# Patient Record
Sex: FEMALE | Race: BLACK OR AFRICAN AMERICAN | Employment: PART TIME | URBAN - METROPOLITAN AREA
[De-identification: names, ages, dates, MRNs, and addresses within clinical notes are randomized per-mention and may not be internally consistent; named-entity substitution may affect disease eponyms.]

---

## 2017-06-09 ENCOUNTER — HOSPITAL ENCOUNTER (OUTPATIENT)
Dept: ULTRASOUND IMAGING | Age: 57
Discharge: HOME | End: 2017-06-09
Attending: NURSE PRACTITIONER
Payer: COMMERCIAL

## 2017-06-09 DIAGNOSIS — E04.9 ENLARGED THYROID: ICD-10-CM

## 2017-06-09 PROCEDURE — 76536 US EXAM OF HEAD AND NECK: CPT

## 2017-07-03 ENCOUNTER — HOSPITAL ENCOUNTER (OUTPATIENT)
Dept: ULTRASOUND IMAGING | Age: 57
Discharge: HOME | End: 2017-07-03
Attending: NURSE PRACTITIONER
Payer: COMMERCIAL

## 2017-07-03 DIAGNOSIS — R31.9 HEMATURIA SYNDROME: ICD-10-CM

## 2017-07-03 PROCEDURE — 76770 US EXAM ABDO BACK WALL COMP: CPT

## 2017-08-07 ENCOUNTER — HOSPITAL ENCOUNTER (OUTPATIENT)
Dept: MAMMOGRAPHY | Age: 57
Discharge: HOME | End: 2017-08-07
Attending: SURGERY
Payer: COMMERCIAL

## 2017-08-07 DIAGNOSIS — Z12.31 ENCOUNTER FOR SCREENING MAMMOGRAM FOR BREAST CANCER: ICD-10-CM

## 2017-08-07 PROCEDURE — 77063 BREAST TOMOSYNTHESIS BI: CPT

## 2018-08-08 ENCOUNTER — HOSPITAL ENCOUNTER (OUTPATIENT)
Dept: MAMMOGRAPHY | Age: 58
Discharge: HOME | End: 2018-08-08
Attending: NURSE PRACTITIONER
Payer: COMMERCIAL

## 2018-08-08 DIAGNOSIS — Z12.31 VISIT FOR SCREENING MAMMOGRAM: ICD-10-CM

## 2018-08-08 PROCEDURE — 77063 BREAST TOMOSYNTHESIS BI: CPT

## 2019-08-05 ENCOUNTER — HOSPITAL ENCOUNTER (OUTPATIENT)
Dept: PHYSICAL THERAPY | Age: 59
End: 2019-08-05
Payer: MEDICAID

## 2019-08-12 ENCOUNTER — HOSPITAL ENCOUNTER (OUTPATIENT)
Dept: MAMMOGRAPHY | Age: 59
Discharge: HOME | End: 2019-08-12
Attending: NURSE PRACTITIONER
Payer: MEDICAID

## 2019-08-12 DIAGNOSIS — Z12.31 VISIT FOR SCREENING MAMMOGRAM: ICD-10-CM

## 2019-08-12 PROCEDURE — 77063 BREAST TOMOSYNTHESIS BI: CPT

## 2019-08-14 ENCOUNTER — HOSPITAL ENCOUNTER (OUTPATIENT)
Dept: PHYSICAL THERAPY | Age: 59
Discharge: HOME | End: 2019-08-14
Payer: MEDICAID

## 2019-08-14 PROCEDURE — 97110 THERAPEUTIC EXERCISES: CPT

## 2019-08-14 PROCEDURE — 97161 PT EVAL LOW COMPLEX 20 MIN: CPT

## 2019-08-14 PROCEDURE — 97530 THERAPEUTIC ACTIVITIES: CPT

## 2019-08-14 NOTE — PROGRESS NOTES
PT DAILY TREATMENT NOTE - [facility]     Patient Name: Edelmira Lee  Date:2019  : 1960  [x]  Patient  Verified  Payor: BLUE CROSS MEDICAID / Plan: [facility] [employer] HE[employer]ERS PLUS / Product Type: Managed Care Medicaid /    In time:908  Out time:955  Total Treatment Time (min): 47  Total Timed Codes (min): 25  1:1 Treatment Time ( only): 52   Visit #: 1 of 4    Treatment Area: Pain in left foot [M79.672]  Plantar fasciitis of left foot [M72.2]    SUBJECTIVE  Pain Level (0-10 scale): 0  Any medication changes, allergies to medications, adverse drug reactions, diagnosis change, or new procedure performed?: [x] No    [] Yes (see summary sheet for update)  Subjective functional status:   [x] See Eval form in paper chart      OBJECTIVE    22 min []Eval                  []Re-Eval       10 min Therapeutic Exercise:  [x] See flow sheet :   Rationale: increase ROM, increase strength and improve coordination to improve the patients ability to perform ADLs. 15 min Therapeutic Activity:  [x]  See flow sheet :patient education on foot type, wear and LE mechanics/ squatting. Work modifications   Rationale: increase ROM, increase strength, improve coordination, improve balance and increase proprioception  to improve the patients ability to perform work tasks. With   [] TE   [] TA   [] neuro   [] other: Patient Education: [x] Review HEP    [] Progressed/Changed HEP based on:   [] positioning   [] body mechanics   [] transfers   [] heat/ice application    [] other:                  Pain Level (0-10 scale) post treatment: 0    ASSESSMENT:   [x]  See Evaluation         Goals:  Short Term Goals: To be accomplished in 1 weeks:  1. Therapist to establish HEP for ROM & Strengthening to improve ease with gait & ADLs. Long Term Goals: To be accomplished in 4 treatments:  1. Patient will be independent with HEP to improve carryover of functional gains with ADLs between visits. Eval Status:n/a  2. Pt will increase left ankle DF to 5 degrees to normalize gait pattern. Eval Status DF: -6 deg  3. Pt will increase FOTO score to 67 points to demonstrate improved functional mobility   Eval Status:FOTO: 64  4. Pt will increase left ankle EV strength to grossly 5/5 with MMT to improve ankle stability for gait/balance.     Eval Status:  EV: 4/5    PLAN      [x]  Continue plan of care    []  Other:_      Aniket Sethi, PT 8/14/2019  10:11 AM

## 2019-08-14 NOTE — PROGRESS NOTES
[facility] [address], [address] [phone] fax    Plan of Care/ Statement of Necessity for Physical Therapy Services           Patient name: Royal Miller Start of Care: 2019   Referral source: [place], [place] : 1960    Medical Diagnosis: Pain in left foot [M79.672]  Plantar fasciitis of left foot [M72.2]  Payor: [id number] / Plan: [employer] / Product Type: Managed Care Medicaid /  Onset Date:6 months    Treatment Diagnosis: left ankle/foot pain. 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 Functionally independent. Lives in 1st level apartment    The Plan of Care and following information is based on the information from the initial evaluation. Assessment/ key information: Patient is a 60 y. o.female presenting with Pain in left foot [M79.672]  Plantar fasciitis of left foot [M72.2]. Ms. Cedric Cyr presents to initial Pt evaluation with c/o worsening left foot pain over the pst 6 months. She reports pain waxes/wanes depending on activity level and is most limited with prolonged stand/walk. She displays noted hypermobility of the left midtarsal joint with arch drop during standing/gait. She also displays flexibility restrictions in the left LE. Symptoms consistent with plantar fasciitis. Discussed proper footwear options at length to prevent exacerbation of symptoms . Patient will benefit from skilled PT services to address deficits and facilitate return to premorbid activity level and promote improved quality of life.        Evaluation Complexity History LOW Complexity : Zero comorbidities / personal factors that will impact the outcome / POC; Examination LOW Complexity : 1-2 Standardized tests and measures addressing body structure, function, activity limitation and / or participation in recreation  ;Presentation MEDIUM Complexity : Evolving with changing characteristics  ; Clinical Decision Making MEDIUM Complexity : FOTO score of 26-74  Overall Complexity Rating: LOW   Problem List: pain affecting function, decrease ROM, decrease strength, edema affecting function, impaired gait/ balance, decrease ADL/ functional abilitiies, decrease activity tolerance, decrease flexibility/ joint mobility and decrease transfer abilities   Treatment Plan may include any combination of the following: Therapeutic exercise, Therapeutic activities, Neuromuscular re-education, Physical agent/modality, Gait/balance training, Manual therapy, Aquatic therapy, Patient education, Self Care training, Functional mobility training, Home safety training and Stair training  Patient / Family readiness to learn indicated by: asking questions, trying to perform skills and interest  Persons(s) to be included in education: patient (P)  Barriers to Learning/Limitations: None  Patient Goal (s): less pain.   Patient Self Reported Health Status: fair  Rehabilitation Potential: good  Short Term Goals: To be accomplished in 1 weeks:  1. Therapist to establish HEP for ROM & Strengthening to improve ease with gait & ADLs. Long Term Goals: To be accomplished in 4 treatments:  1. Patient will be independent with HEP to improve carryover of functional gains with ADLs between visits. Eval Status:n/a  2. Pt will increase left ankle DF to 5 degrees to normalize gait pattern. Eval Status DF: -6 deg  3. Pt will increase FOTO score to 67 points to demonstrate improved functional mobility   Eval Status:FOTO: 64  4. Pt will increase left ankle EV strength to grossly 5/5 with MMT to improve ankle stability for gait/balance. Eval Status:  EV: 4/5    Frequency / Duration: Patient to be seen 1 times per week for 4 treatments.     Patient/ Caregiver education and instruction: Diagnosis, prognosis, self care, activity modification and exercises   [x]  Plan of care has been reviewed with JUANCARLOS Acevedo, PT 8/14/2019 10:02 AM    ________________________________________________________________________    I certify that the above Therapy Services are being furnished while the patient is under my care. I agree with the treatment plan and certify that this therapy is necessary.     Physician's Signature:____________Date:_________TIME:________    ** Signature, Date and Time must be completed for valid certification **    Please sign and return to [facility] [address], [address] [phone] fax

## 2019-08-21 ENCOUNTER — HOSPITAL ENCOUNTER (OUTPATIENT)
Dept: PHYSICAL THERAPY | Age: 59
Discharge: HOME | End: 2019-08-21
Payer: MEDICAID

## 2019-08-21 PROCEDURE — 97112 NEUROMUSCULAR REEDUCATION: CPT

## 2019-08-21 NOTE — PROGRESS NOTES
PT DAILY TREATMENT NOTE 10-18    Patient Name: Michael Adames  Date:2019  : 1960  [x]  Patient  Verified  Payor: BLUE CROSS MEDICAID / Plan: [facility] [employer] HEALTHKEEPERS PLUS / Product Type: Managed Care Medicaid /    In time:800  Out time:835  Total Treatment Time (min): 35  Visit #: 2 of 4    Medicare/BCBS Only   Total Timed Codes (min):  35 1:1 Treatment Time:  35       Treatment Area: Pain in left foot [M79.672]  Plantar fasciitis of left foot [M72.2]    SUBJECTIVE  Pain Level (0-10 scale): 0  Any medication changes, allergies to medications, adverse drug reactions, diagnosis change, or new procedure performed?: [x] No    [] Yes (see summary sheet for update)  Subjective functional status/changes:   [] No changes reported  \"No pain this morning. \"    OBJECTIVE    Modality rationale: patient declined   Min Type Additional Details    [] Estim:  []Unatt       []IFC  []Premod                        []Other:  []w/ice   []w/heat  Position:  Location:    [] Estim: []Att    []TENS instruct  []NMES                    []Other:  []w/US   []w/ice   []w/heat  Position:  Location:    []  Traction: [] Cervical       []Lumbar                       [] Prone          []Supine                       []Intermittent   []Continuous Lbs:  [] before manual  [] after manual    []  Ultrasound: []Continuous   [] Pulsed                           []1MHz   []3MHz W/cm2:  Location:    []  Iontophoresis with dexamethasone         Location: [] Take home patch   [] In clinic    []  Ice     []  heat  []  Ice massage  []  Laser   []  Anodyne Position:  Location:    []  Laser with stim  []  Other:  Position:  Location:    []  Vasopneumatic Device Pressure:       [] lo [] med [] hi   Temperature: [] lo [] med [] hi   [] Skin assessment post-treatment:  []intact []redness- no adverse reaction    []redness  adverse reaction:     10 min Therapeutic Exercise:  [x] See flow sheet :   Rationale: increase ROM and increase strength to improve the patients ability to perform ADLs    25 min Neuromuscular Re-education:  [x]  See flow sheet : ankle/foot stabilization activities   Rationale: increase ROM, increase strength, improve coordination, improve balance and increase proprioception  to improve the patients ability to improve mobility, stance stability, and gait       With   [x] TE   [] TA   [x] neuro   [] other: Patient Education: [x] Review HEP    [] Progressed/Changed HEP based on:   [x] positioning   [x] body mechanics   [] transfers   [] heat/ice application    [] other:      Other Objective/Functional Measures:      Pain Level (0-10 scale) post treatment: 0    ASSESSMENT/Changes in Function: Initiated POC to which pt responded well. She was challenged with up with 2 down with 1 and HR off step. She reports improvements with pain and that she is still adjusting to her new inserts in her shoes. Patient will continue to benefit from skilled PT services to modify and progress therapeutic interventions, address functional mobility deficits, address ROM deficits, address strength deficits, analyze and address soft tissue restrictions, analyze and cue movement patterns, analyze and modify body mechanics/ergonomics, assess and modify postural abnormalities, address imbalance/dizziness and instruct in home and community integration to attain remaining goals. [x]  See Plan of Care  []  See progress note/recertification  []  See Discharge Summary         Progress towards goals / Updated goals:  Short Term Goals: To be accomplished in 1 weeks:  1. Therapist to establish HEP for ROM & Strengthening to improve ease with gait & ADLs. MET   Long Term Goals: To be accomplished in 4 treatments:  1. Patient will be independent with HEP to improve carryover of functional gains with ADLs between visits.                Eval Status:n/a    Reports compliance, but has some difficulty with a few exercises on HEP  2. Pt will increase left ankle DF to 5 degrees to normalize gait pattern. Eval Status DF: -6 deg    Measure at later visit  3. Pt will increase FOTO score to 67 points to demonstrate improved functional mobility              Eval Status:FOTO: 64    Assess at 30 day shelbi  4. Pt will increase left ankle EV strength to grossly 5/5 with MMT to improve ankle stability for gait/balance.               Eval Status:  EV: 4/5    Too soon to see appreciable progress    PLAN  []  Upgrade activities as tolerated     [x]  Continue plan of care  []  Update interventions per flow sheet       []  Discharge due to:_  []  Other:_      Richard Villalpando, PTA, CSCS 8/21/2019  8:50 AM    Future Appointments   Date Time Provider Selena Gautam   8/28/2019  7:30 AM Phil Avelar, PT [facility]   9/4/2019  7:30 AM Phil Avelar PT [facility]   9/11/2019  7:30 AM Phil Avelar PT [facility]

## 2019-08-28 ENCOUNTER — HOSPITAL ENCOUNTER (OUTPATIENT)
Dept: PHYSICAL THERAPY | Age: 59
Discharge: HOME | End: 2019-08-28
Payer: MEDICAID

## 2019-08-28 PROCEDURE — 97112 NEUROMUSCULAR REEDUCATION: CPT

## 2019-08-28 PROCEDURE — 97110 THERAPEUTIC EXERCISES: CPT

## 2019-08-28 NOTE — PROGRESS NOTES
PT DISCHARGE DAILY NOTE AND [id number]    Patient name: Delfin Camarillo Start of Care: 2019   Referral source: [address] : 1960                Medical Diagnosis: Pain in left foot [M79.672]  Plantar fasciitis of left foot [M72.2]  Payor: [id number] / Plan: [employer] / Product Type: Managed Care Medicaid /  Onset Date:6 months                Treatment Diagnosis: left ankle/foot pain. 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 Functionally independent. Lives in 1st level apartment         Visits from Start of Care: 3    Missed Visits: 0    Reporting Period : 19 to 19    Date:2019  : 1960  [x]  Patient  Verified  Payor: BLUE CROSS MEDICAID / Plan: [employer] / Product Type: Managed Care Medicaid /    In time:735  Out time:828  Total Treatment Time (min): 48  Visit #: 3 of 4    Medicare/BCBS Only   Total Timed Codes (min):  53 1:1 Treatment Time:  53       SUBJECTIVE  Pain Level (0-10 scale): 0  Any medication changes, allergies to medications, adverse drug reactions, diagnosis change, or new procedure performed?: [x] No    [] Yes (see summary sheet for update)  Subjective functional status/changes:   [] No changes reported  \"I'm doing better. \"    OBJECTIVE    25 min Therapeutic Exercise:  [x] See flow sheet :   Rationale: increase ROM and increase strength to improve the patients ability to perform ADLs     28 min Neuromuscular Re-education:  [x]  See flow sheet : ankle/foot stabilization activities   Rationale: increase ROM, increase strength, improve coordination, improve balance and increase proprioception  to improve the patients ability to improve mobility, stance stability, and gait                 With   [] TE   [] TA   [] neuro   [] other: Patient Education: [x] Review HEP    [] Progressed/Changed HEP based on:   [] positioning   [] body mechanics   [] transfers   [] heat/ice application    [] other:      Other Objective/Functional Measures:   Functional Gains: walking, standing, less pain  Functional Deficits: stiffness on rainy days  % improvement: 98%  Pain   Average: 0/10       Best: 0/10     Worst: 1/10  Patient Goal: \"To this up. \"       Pain Level (0-10 scale) post treatment: 0    Summary of Care:  Short Term Goals: To be accomplished in 1 weeks:  1. Therapist to establish HEP for ROM & Strengthening to improve ease with gait & ADLs. [place] be accomplished in 4 treatments:  1. Patient will be independent with HEP to improve carryover of functional gains with ADLs between visits.             Eval Status:n/a              MET: Reports compliance, but has some difficulty with a few exercises on HEP  2. Pt will increase left ankle DF to 5 degrees to normalize gait pattern.               Eval Status DF: -6 deg              PROGRESSED: 3 deg  3. Pt will increase FOTO score to 67 points to demonstrate improved functional mobility              Eval Status:FOTO: 64              MET: 89  4. Pt will increase left ankle EV strength to grossly 5/5 with MMT to improve ankle stability for gait/balance.              Eval Status:  EV: 4/5              MET: 5/5    ASSESSMENT/Changes in Function: Ms. Andrew Nelson has made excellent progress with PT for improved pain and ankle strength/ROM. Recommend discharge to [facility] at this time for self management of symptoms.      Thank you for this referral!      PLAN  [x]Discontinue therapy: [x]Patient has reached or is progressing toward set goals      []Patient is non-compliant or has abdicated      []Due to lack of appreciable progress towards set [address], PT 8/28/2019  8:09 AM

## 2019-08-28 NOTE — PROGRESS NOTES
Physical Therapy Discharge Instructions      [facility] [place] [address]  [address], [address] [phone] fax      Patient: Arlet Mcclellan  : 1960      Continue Home Exercise Program 1-2 times per day for 4 weeks, then decrease to 3 times per week      Continue with    [x] Ice  as needed 2 times per day     [x] Heat           Follow up with MD:     [] Upon completion of therapy     [x] As needed      Recommendations:     [x]   Return to activity with home program    []   Return to activity with the following modifications:       []Post Rehab Program    []Join Independent aquatic program     []Return to/join local gym        Additional Comments: Madina Wood Job!           Oda Baumgarten, PT 2019 8:19 AM

## 2019-09-04 ENCOUNTER — APPOINTMENT (OUTPATIENT)
Dept: PHYSICAL THERAPY | Age: 59
End: 2019-09-04

## 2019-09-11 ENCOUNTER — APPOINTMENT (OUTPATIENT)
Dept: PHYSICAL THERAPY | Age: 59
End: 2019-09-11

## 2020-09-16 ENCOUNTER — HOSPITAL ENCOUNTER (OUTPATIENT)
Dept: MAMMOGRAPHY | Age: 60
Discharge: HOME | End: 2020-09-16
Attending: NURSE PRACTITIONER
Payer: MEDICAID

## 2020-09-16 DIAGNOSIS — Z12.31 VISIT FOR SCREENING MAMMOGRAM: ICD-10-CM

## 2020-09-16 PROCEDURE — 77063 BREAST TOMOSYNTHESIS BI: CPT

## 2020-10-07 ENCOUNTER — HOSPITAL ENCOUNTER (OUTPATIENT)
Dept: NUTRITION | Age: 60
Discharge: HOME | End: 2020-10-07
Payer: MEDICAID

## 2020-10-07 PROCEDURE — 97802 MEDICAL NUTRITION INDIV IN: CPT

## 2020-10-08 NOTE — PROGRESS NOTES
[address] [place] [address]     Nutrition Assessment  Medical Nutrition Therapy   Outpatient Initial Evaluation         Patient Name: Roderick Tavarez : 1960   Treatment Diagnosis: Type 2 diabetic  Obesity  HTN   Referral Source: Sarah Beth Luna NP Start of Care [facility]): 10/7/2020     Gender: female Age: 60 y.o. Ht: 64 in Wt:  266 lb  kg   BMI: 39 BMR   Male  BMR Female 1760     Past Medical History:  Type 2 diabetes. Pertinent Medications:        Biochemical Data:   No results found for: HBA1C, HGBE8, ISX1DRYV, BKL7FGUR  Lab Results   Component Value Date/Time    Sodium 137 10/27/2015 10:35 AM    Potassium 4.2 10/27/2015 10:35 AM    Chloride 107 10/27/2015 10:35 AM    CO2 28 10/27/2015 10:35 AM    Anion gap 2 (L) 10/27/2015 10:35 AM    Glucose 98 10/27/2015 10:35 AM    BUN 8 10/27/2015 10:35 AM    Creatinine 0.84 10/27/2015 10:35 AM    BUN/Creatinine ratio 10 (L) 10/27/2015 10:35 AM    GFR est AA >60 10/27/2015 10:35 AM    GFR est non-AA >60 10/27/2015 10:35 AM    Calcium 8.9 10/27/2015 10:35 AM    Bilirubin, total 0.5 10/27/2015 10:35 AM    Alk. phosphatase 79 10/27/2015 10:35 AM    Protein, total 7.3 10/27/2015 10:35 AM    Albumin 3.7 10/27/2015 10:35 AM    Globulin 3.6 10/27/2015 10:35 AM    A-G Ratio 1.0 10/27/2015 10:35 AM    ALT (SGPT) 28 10/27/2015 10:35 AM    AST (SGOT) 15 10/27/2015 10:35 AM     Lab Results   Component Value Date/Time    Cholesterol, total 203 (H) 2013 08:21 AM    HDL Cholesterol 56 2013 08:21 AM    LDL, calculated 126.4 (H) 2013 08:21 AM    VLDL, calculated 20.6 2013 08:21 AM    Triglyceride 103 2013 08:21 AM    CHOL/HDL Ratio 3.6 2013 08:21 AM     Lab Results   Component Value Date/Time    ALT (SGPT) 28 10/27/2015 10:35 AM    AST (SGOT) 15 10/27/2015 10:35 AM    Alk.  phosphatase 79 10/27/2015 10:35 AM    Bilirubin, total 0.5 10/27/2015 10:35 AM     Lab Results   Component Value Date/Time    Creatinine, POC 0.8 03/25/2016 06:31 PM    Creatinine 0.84 10/27/2015 10:35 AM     Lab Results   Component Value Date/Time    BUN 8 10/27/2015 10:35 AM     No results found for: MCACR, MCA1, MCA2, MCA3, MCAU, MCAU2, MCALPOCT     Assessment:   Patient is a 60year old female who visits the RD with help to lose weight and obtain better BS control. Patient live alone and does not like to cook. Food & Nutrition: Patient eats 2-3 meals a day consisting of starch, protein and sometimes vegetables. Patient consumes liquid calories consisting of milk and tea and honey. Estimate Needs   Calories: 1760  Protein: 100 Carbs: 125 Fat: <60   Kcal/day  g/day  g/day  g/day                            Nutrition Diagnosis Excessive oral intake. Excessive carbohydrate intake. Nutrition Intervention &  Education: Patient was educated on the importance of making lifestyle changes such as:  1. Eating off a smaller plate and drinking from smaller glasses. 2. Increasing activity. 3. Menu planning and tracking. Handouts Provided: [x]  Carbohydrates  [x]  Protein  [x]  Non-starchy Vegatbles  [x]  Food Label  [x]  Meal and Snack Ideas  [x]  Food Journals []  Diabetes  []  Cholesterol  []  Sodium  [x]  Gen Nutr Guidelines  []  SBGM Guidelines  []  Others:   Information Reviewed with: Patient    Readiness to Change Stage:   []  Pre-contemplative    []  Contemplative  []  Preparation               [x]  Action                  []  Maintenance   Potential Barriers to Learning: []  Decline in memory    []  Language barrier   []  Other:  []  Emotional                  []  Limited mobility  []  Lack of motivation     [] Vision, hearing or cognitive impairment   Expected Compliance: Good.      Nutritional Goal - To promote lifestyle changes to result in:    [x]  Weight loss  [x]  Improved diabetic control  []  Decreased cholesterol levels  [x]  Decreased blood pressure  []  Weight maintenance []  Preventing any interactions associated with food allergies  []  Adequate weight gain toward goal weight  []  Other:        Patient Goals:   Patient desires to lose weight. Dietitian Signature:  Sarah Beth Butcher RD Date: 10/7/2020   Follow-up: Scheduled Time: 8:55 PM

## 2020-11-04 ENCOUNTER — HOSPITAL ENCOUNTER (OUTPATIENT)
Dept: NUTRITION | Age: 60
Discharge: HOME | End: 2020-11-04
Payer: MEDICAID

## 2020-11-04 PROCEDURE — 97803 MED NUTRITION INDIV SUBSEQ: CPT

## 2020-11-09 NOTE — PROGRESS NOTES
NUTRITION  FOLLOW-UP TREATMENT NOTE  Patient Name: Val Lucero         Date: 2020  : 1960    YES/NO Patient  Verified  Diagnosis:  Type 2 diabetes; Obesity   In time:  10:00 am             Out time:   10:30 am   Total Treatment Time (min):   30     SUBJECTIVE/ASSESSMENT      Changes in medication or medical history? Any new allergies, surgeries or procedures? YES/NO    If yes, update Summary List   None noted. Current Wt: 264# Previous Wt: 266# Wt Change: 2#     Achievement of Goals: Patient lost 2#. Patient shares that her daughter and 3 grandchildren have moved in with her over the last month. This has made it a little difficult for patient to focus on her own needs. Patient Education:  [x]  Review current plan with patient   [x]  Other: Provided benefits of exercise handout. Handouts/  Information Provided: []  Carbohydrates  []  Protein  []  Fiber  []  Serving Sizes  []  Fluids  []  General guidelines []  Diabetes  []  Cholesterol  []  Sodium  []  SBGM  []  Food Journals  [x]  Others:      New Patient Goals: Continue current plan.      PLAN    [x]  Continue on current plan []  Follow-up PRN   []  Discharge due to :    [x]  Next appt: 2020 at 11:00 am     Dietitian: Fareed Lyons RD    Date: 2020 Time: 12:39 PM

## 2021-09-08 ENCOUNTER — TRANSCRIBE ORDER (OUTPATIENT)
Dept: SCHEDULING | Age: 61
End: 2021-09-08

## 2021-09-08 DIAGNOSIS — Z12.31 VISIT FOR SCREENING MAMMOGRAM: Primary | ICD-10-CM

## 2021-09-17 ENCOUNTER — HOSPITAL ENCOUNTER (OUTPATIENT)
Dept: MAMMOGRAPHY | Age: 61
Discharge: HOME | End: 2021-09-17
Attending: NURSE PRACTITIONER
Payer: MEDICAID

## 2021-09-17 DIAGNOSIS — Z12.31 VISIT FOR SCREENING MAMMOGRAM: ICD-10-CM

## 2021-09-17 PROCEDURE — 77063 BREAST TOMOSYNTHESIS BI: CPT

## 2022-08-07 ENCOUNTER — APPOINTMENT (OUTPATIENT)
Dept: GENERAL RADIOLOGY | Age: 62
End: 2022-08-07
Attending: EMERGENCY MEDICINE
Payer: MEDICAID

## 2022-08-07 ENCOUNTER — HOSPITAL ENCOUNTER (EMERGENCY)
Age: 62
Discharge: HOME | End: 2022-08-07
Attending: EMERGENCY MEDICINE
Payer: MEDICAID

## 2022-08-07 VITALS
DIASTOLIC BLOOD PRESSURE: 82 MMHG | SYSTOLIC BLOOD PRESSURE: 152 MMHG | TEMPERATURE: 98.4 F | RESPIRATION RATE: 18 BRPM | HEART RATE: 95 BPM | OXYGEN SATURATION: 97 %

## 2022-08-07 DIAGNOSIS — M25.561 ACUTE PAIN OF RIGHT KNEE: Primary | ICD-10-CM

## 2022-08-07 PROCEDURE — 74011250637 HC RX REV CODE- 250/637: Performed by: PHYSICIAN ASSISTANT

## 2022-08-07 PROCEDURE — 99283 EMERGENCY DEPT VISIT LOW MDM: CPT

## 2022-08-07 PROCEDURE — 73564 X-RAY EXAM KNEE 4 OR MORE: CPT

## 2022-08-07 RX ORDER — ACETAMINOPHEN 500 MG
1000 TABLET ORAL
Status: COMPLETED | OUTPATIENT
Start: 2022-08-07 | End: 2022-08-07

## 2022-08-07 RX ORDER — ACETAMINOPHEN 500 MG
1000 TABLET ORAL
Qty: 20 TABLET | Refills: 0 | Status: SHIPPED | OUTPATIENT
Start: 2022-08-07

## 2022-08-07 RX ORDER — LIDOCAINE 50 MG/G
PATCH TOPICAL
Qty: 30 EACH | Refills: 0 | Status: SHIPPED | OUTPATIENT
Start: 2022-08-07

## 2022-08-07 RX ADMIN — ACETAMINOPHEN 1000 MG: 500 TABLET ORAL at 18:16

## 2022-08-07 NOTE — ED TRIAGE NOTES
Pt arrived with c/o of right knee pain that started yesterday. Pt denies any falls or injury to the knee.

## 2022-08-07 NOTE — Clinical Note
[facility] EMERGENCY DEPT  [address] [address] [phone]    Work/School Note    Date: 8/7/2022    To Whom It May concern:      Skylar Austin was seen and treated today in the emergency room by the following provider(s):  Attending Provider: Nirmal Yuan MD  Physician Assistant: Brittany Godfrey, [address]. Skylar Austin is excused from work/school on 08/07/22. She is clear to return to work/school on 08/08/22.         Sincerely,          DAWOOD Burrell
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
## 2022-08-07 NOTE — Clinical Note
[address] [place] [address] Dr [facility] EMERGENCY DEPT  [address] [address] [phone]    Work/School Note    Date: 8/7/2022    To Whom It May concern:      Cynthia Horn was seen and treated today in the emergency room by the following provider(s):  Attending Provider: Elsa Montoya MD  Physician Assistant: Frank Mauk, [place]. [place] Horn is excused from work/school on 08/07/22. She is clear to return to work/school on 08/08/22.         Sincerely,          DAWOOD Arguello

## 2022-08-07 NOTE — DISCHARGE INSTRUCTIONS
Take medication as prescribed. Follow-up with your orthopedic physician within 2 days for reassessment. Bring the results from this visit with you for their review. Return to the ED immediately for any new, worsening, or persistent symptoms, including leg swelling, discoloration, or any other medical concerns.

## 2022-08-19 ENCOUNTER — OFFICE VISIT (OUTPATIENT)
Dept: ORTHOPEDIC SURGERY | Age: 62
End: 2022-08-19
Payer: MEDICAID

## 2022-08-19 VITALS
HEIGHT: 64 IN | TEMPERATURE: 97.7 F | HEART RATE: 80 BPM | BODY MASS INDEX: 43.87 KG/M2 | WEIGHT: 257 LBS | OXYGEN SATURATION: 98 %

## 2022-08-19 DIAGNOSIS — M25.661 DECREASED RANGE OF MOTION OF RIGHT KNEE: ICD-10-CM

## 2022-08-19 DIAGNOSIS — M25.561 ACUTE PAIN OF RIGHT KNEE: Primary | ICD-10-CM

## 2022-08-19 DIAGNOSIS — E66.01 MORBID OBESITY WITH BMI OF 40.0-44.9, ADULT (HCC): ICD-10-CM

## 2022-08-19 DIAGNOSIS — M23.8X1 KNEE CREPITUS, RIGHT: ICD-10-CM

## 2022-08-19 DIAGNOSIS — M25.461 EFFUSION OF RIGHT KNEE JOINT: ICD-10-CM

## 2022-08-19 DIAGNOSIS — M17.11 ARTHRITIS OF KNEE, RIGHT: ICD-10-CM

## 2022-08-19 PROCEDURE — 99204 OFFICE O/P NEW MOD 45 MIN: CPT | Performed by: PHYSICIAN ASSISTANT

## 2022-08-19 PROCEDURE — 73562 X-RAY EXAM OF KNEE 3: CPT | Performed by: PHYSICIAN ASSISTANT

## 2022-08-19 RX ORDER — DICLOFENAC SODIUM 10 MG/G
4 GEL TOPICAL 4 TIMES DAILY
Qty: 100 G | Refills: 2 | Status: SHIPPED | OUTPATIENT
Start: 2022-08-19
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
## 2022-08-26 ENCOUNTER — TRANSCRIBE ORDER (OUTPATIENT)
Dept: SCHEDULING | Age: 62
End: 2022-08-26

## 2022-08-26 DIAGNOSIS — Z12.31 VISIT FOR SCREENING MAMMOGRAM: Primary | ICD-10-CM

## 2022-09-19 ENCOUNTER — HOSPITAL ENCOUNTER (OUTPATIENT)
Dept: MAMMOGRAPHY | Age: 62
Discharge: HOME | End: 2022-09-19
Attending: NURSE PRACTITIONER
Payer: MEDICAID

## 2022-09-19 DIAGNOSIS — Z12.31 VISIT FOR SCREENING MAMMOGRAM: ICD-10-CM

## 2022-09-19 PROCEDURE — 77063 BREAST TOMOSYNTHESIS BI: CPT

## 2023-07-11 ENCOUNTER — OFFICE VISIT (OUTPATIENT)
Age: 63
End: 2023-07-11
Payer: MEDICAID

## 2023-07-11 VITALS — HEIGHT: 62 IN | BODY MASS INDEX: 46.56 KG/M2 | TEMPERATURE: 98 F | WEIGHT: 253 LBS

## 2023-07-11 DIAGNOSIS — G89.29 CHRONIC PAIN OF RIGHT KNEE: ICD-10-CM

## 2023-07-11 DIAGNOSIS — M25.661 STIFFNESS OF RIGHT KNEE, NOT ELSEWHERE CLASSIFIED: ICD-10-CM

## 2023-07-11 DIAGNOSIS — M17.11 UNILATERAL PRIMARY OSTEOARTHRITIS, RIGHT KNEE: Primary | ICD-10-CM

## 2023-07-11 DIAGNOSIS — M25.561 CHRONIC PAIN OF RIGHT KNEE: ICD-10-CM

## 2023-07-11 DIAGNOSIS — M25.461 EFFUSION, RIGHT KNEE: ICD-10-CM

## 2023-07-11 DIAGNOSIS — E66.01 OBESITY, MORBID, BMI 40.0-49.9 (HCC): ICD-10-CM

## 2023-07-11 PROCEDURE — 73562 X-RAY EXAM OF KNEE 3: CPT | Performed by: PHYSICIAN ASSISTANT

## 2023-07-11 PROCEDURE — 20611 DRAIN/INJ JOINT/BURSA W/US: CPT | Performed by: PHYSICIAN ASSISTANT

## 2023-07-11 PROCEDURE — 99213 OFFICE O/P EST LOW 20 MIN: CPT | Performed by: PHYSICIAN ASSISTANT

## 2023-07-11 RX ORDER — TRIAMCINOLONE ACETONIDE 40 MG/ML
40 INJECTION, SUSPENSION INTRA-ARTICULAR; INTRAMUSCULAR ONCE
Status: COMPLETED | OUTPATIENT
Start: 2023-07-11 | End: 2023-07-11

## 2023-07-11 RX ADMIN — TRIAMCINOLONE ACETONIDE 40 MG: 40 INJECTION, SUSPENSION INTRA-ARTICULAR; INTRAMUSCULAR at 09:49

## 2023-07-11 NOTE — PROGRESS NOTES
often unanticipated grammatical, syntax, homophones, and other interpretive errors are inadvertently transcribed by the computer software. Please disregard these errors. Please excuse any errors that have escaped final proofreading. Kathi STOCKTON, APC, MPAS, PA-C  [address] [facility]

## 2023-08-08 ENCOUNTER — TELEPHONE (OUTPATIENT)
Age: 63
End: 2023-08-08

## 2023-08-08 NOTE — TELEPHONE ENCOUNTER
Patient called requesting a copy of her xray of the rt knee and would like to be notified when ready to  at HS.      Pt tel: [phone]

## 2023-09-07 SDOH — HEALTH STABILITY: PHYSICAL HEALTH: ON AVERAGE, HOW MANY MINUTES DO YOU ENGAGE IN EXERCISE AT THIS LEVEL?: 10 MIN

## 2023-09-07 SDOH — HEALTH STABILITY: PHYSICAL HEALTH: ON AVERAGE, HOW MANY DAYS PER WEEK DO YOU ENGAGE IN MODERATE TO STRENUOUS EXERCISE (LIKE A BRISK WALK)?: 1 DAY

## 2023-09-08 ENCOUNTER — OFFICE VISIT (OUTPATIENT)
Age: 63
End: 2023-09-08
Payer: MEDICAID

## 2023-09-08 DIAGNOSIS — M17.0 OSTEOARTHRITIS OF BOTH KNEES, UNSPECIFIED OSTEOARTHRITIS TYPE: Primary | ICD-10-CM

## 2023-09-08 PROCEDURE — 99203 OFFICE O/P NEW LOW 30 MIN: CPT | Performed by: ORTHOPAEDIC SURGERY

## 2023-09-08 RX ORDER — LOSARTAN POTASSIUM 100 MG/1
TABLET ORAL
COMMUNITY
Start: 2023-06-26

## 2023-09-08 RX ORDER — AMLODIPINE BESYLATE 5 MG/1
TABLET ORAL
COMMUNITY
Start: 2023-08-19

## 2023-09-08 RX ORDER — DAPAGLIFLOZIN 5 MG/1
TABLET, FILM COATED ORAL
COMMUNITY
Start: 2023-09-05

## 2023-09-08 RX ORDER — MONTELUKAST SODIUM 10 MG/1
TABLET ORAL
COMMUNITY
Start: 2023-08-30

## 2023-09-08 RX ORDER — TRIAMCINOLONE ACETONIDE 5 MG/G
CREAM TOPICAL
COMMUNITY
Start: 2023-06-26

## 2023-09-08 RX ORDER — ATORVASTATIN CALCIUM 10 MG/1
TABLET, FILM COATED ORAL
COMMUNITY
Start: 2023-07-03

## 2023-09-08 NOTE — PROGRESS NOTES
Name: Gagandeep Ambrocio    : 1960     [facility]  [place] [address] AND SPORTS MEDICINE  [address] [place] [address], [place] [address]  Dept: [phone]  Dept Fax: [phone]     Chief Complaint   Patient presents with    Knee Pain        There were no vitals taken for this visit. Allergies   Allergen Reactions    Lisinopril Swelling     Facial swelling        Current Outpatient Medications   Medication Sig Dispense Refill    atorvastatin (LIPITOR) 10 MG tablet       amLODIPine (NORVASC) 5 MG tablet       FARXIGA 5 MG tablet       losartan (COZAAR) 100 MG tablet       montelukast (SINGULAIR) 10 MG tablet       triamcinolone (ARISTOCORT) 0.5 % cream       amLODIPine (NORVASC) 10 MG tablet Take 10 mg by mouth daily      diclofenac sodium (VOLTAREN) 1 % GEL Apply 4 g topically 4 times daily      ergocalciferol (ERGOCALCIFEROL) 1.25 MG (53006 UT) capsule Take 50,000 Units by mouth every 7 days      gabapentin (NEURONTIN) 300 MG capsule Take 300 mg by mouth 3 times daily. hydroCHLOROthiazide (HYDRODIURIL) 25 MG tablet Take 25 mg by mouth daily      meloxicam (MOBIC) 15 MG tablet ceived the following from [facility]: Outside name: meloxicam (MOBIC) 15 mg tablet       No current facility-administered medications for this visit.       Patient Active Problem List   Diagnosis    Pain in limb    Lump or mass in breast    Edema    Essential hypertension    Achilles bursitis or tendinitis      Family History   Problem Relation Age of Onset    Cancer Sister     Breast Cancer Sister     Cancer Sister     Breast Cancer Sister     Cancer Father     Cancer Sister     Breast Cancer Mother     Cancer Mother     Cancer Sister        Past Surgical History:   Procedure Laterality Date    BIOPSY OF BREAST, INCISIONAL      BREAST BIOPSY Right 2015    WIDE LOCAL EXCISION RIGHT BREAST LESION performed by Magan Anderson MD at [address] [place] [address] & [facility]

## 2023-09-20 ENCOUNTER — HOSPITAL ENCOUNTER (OUTPATIENT)
Facility: HOSPITAL | Age: 63
Discharge: HOME | End: 2023-09-23
Payer: MEDICAID

## 2023-09-20 VITALS — HEIGHT: 62 IN | WEIGHT: 252 LBS | BODY MASS INDEX: 46.38 KG/M2

## 2023-09-20 DIAGNOSIS — Z12.31 SCREENING MAMMOGRAM FOR BREAST CANCER: ICD-10-CM

## 2023-09-20 PROCEDURE — 77063 BREAST TOMOSYNTHESIS BI: CPT

## 2024-08-23 ENCOUNTER — TRANSCRIBE ORDERS (OUTPATIENT)
Facility: HOSPITAL | Age: 64
End: 2024-08-23

## 2024-08-23 DIAGNOSIS — Z12.31 SCREENING MAMMOGRAM FOR HIGH-RISK PATIENT: Primary | ICD-10-CM

## 2024-09-30 NOTE — PROGRESS NOTES
Instructions for your procedure at [facility]      Today's Date: 9/30/2024      Patient's Name: Florecita Hurt      Procedure Date: 10/4        Please enter the main entrance of the hospital and check-in at the  located in the lobby.      Do NOT eat or drink anything, including candy, gum, or ice chips after midnight prior to your procedure, unless it is part of your prep.  Brush your teeth before coming to the hospital.You may swish with water, but do not swallow.  No smoking/Vaping/E-Cigarettes 24 hours prior to the day of procedure.  No alcohol 24 hours prior to the day of procedure.  No recreational drugs for one week prior to the day of procedure.  Bring Photo ID, Insurance information, and Co-pay if required on day of procedure.  Bring in pertinent legal documents, such as, Medical Power of , DNR, Advance Directive, etc.  Leave all other valuables, including money/purse, at home.  Remove jewelry, including ALL body piercings, nail polish, acrylic nails, and makeup (including mascara); no lotions, powders, deodorant, and/or perfume/cologne/after shave on the skin.  Glasses and dentures may be worn to the hospital.  They must be removed prior to procedure. Please bring case/container for glasses or dentures.  11. Contacts should not be worn on day of procedure.   12. Call the office ([phone]) if you have symptoms of a cold or illness within 24-48 hours prior to your procedure.   13. AN ADULT (relative or friend 18 years or older) MUST DRIVE YOU HOME AFTER YOUR PROCEDURE.   14. Please make arrangements for a responsible adult (18 years or older) to be with you for 24 hours after your procedure.   15. TWO VISITORS will be allowed in the waiting area during your procedure.       Special Instructions:      Bring list of CURRENT medications.  Follow instructions from the office regarding Bowel Prep, Vitamins, Iron, Blood Thinners, Insulin, Seizure, and Blood  Pressure/Heart medications.      If you have a history of recreational drug use, you may be required to submit a urine sample for drug testing the day of your procedure, as some recreational drugs can interact with anesthetics and increase your surgical risk.    Any questions regarding prep, please call the office at [phone].    For any questions or concerns on the day of procedure, please call the Endo Suite at [phone].    These surgical instructions were reviewed with Florecita Hurt during the PAT phone call.

## 2024-10-03 ENCOUNTER — ANESTHESIA EVENT (OUTPATIENT)
Facility: HOSPITAL | Age: 64
End: 2024-10-03
Payer: COMMERCIAL

## 2024-10-04 ENCOUNTER — HOSPITAL ENCOUNTER (OUTPATIENT)
Facility: HOSPITAL | Age: 64
Setting detail: OUTPATIENT SURGERY
Discharge: HOME | End: 2024-10-04
Attending: INTERNAL MEDICINE | Admitting: INTERNAL MEDICINE
Payer: COMMERCIAL

## 2024-10-04 ENCOUNTER — ANESTHESIA (OUTPATIENT)
Facility: HOSPITAL | Age: 64
End: 2024-10-04
Payer: COMMERCIAL

## 2024-10-04 VITALS
RESPIRATION RATE: 16 BRPM | TEMPERATURE: 97.7 F | WEIGHT: 256.4 LBS | SYSTOLIC BLOOD PRESSURE: 126 MMHG | HEART RATE: 86 BPM | BODY MASS INDEX: 45.43 KG/M2 | HEIGHT: 63 IN | OXYGEN SATURATION: 100 % | DIASTOLIC BLOOD PRESSURE: 82 MMHG

## 2024-10-04 LAB
GLUCOSE BLD STRIP.AUTO-MCNC: 118 MG/DL (ref 70–110)
GLUCOSE BLD STRIP.AUTO-MCNC: 92 MG/DL (ref 70–110)

## 2024-10-04 PROCEDURE — 82962 GLUCOSE BLOOD TEST: CPT

## 2024-10-04 PROCEDURE — 2709999900 HC NON-CHARGEABLE SUPPLY: Performed by: INTERNAL MEDICINE

## 2024-10-04 PROCEDURE — 88305 TISSUE EXAM BY PATHOLOGIST: CPT

## 2024-10-04 PROCEDURE — 2500000003 HC RX 250 WO HCPCS: Performed by: NURSE ANESTHETIST, CERTIFIED REGISTERED

## 2024-10-04 PROCEDURE — 3700000000 HC ANESTHESIA ATTENDED CARE: Performed by: INTERNAL MEDICINE

## 2024-10-04 PROCEDURE — 3600007512: Performed by: INTERNAL MEDICINE

## 2024-10-04 PROCEDURE — 2580000003 HC RX 258: Performed by: NURSE ANESTHETIST, CERTIFIED REGISTERED

## 2024-10-04 PROCEDURE — 7100000011 HC PHASE II RECOVERY - ADDTL 15 MIN: Performed by: INTERNAL MEDICINE

## 2024-10-04 PROCEDURE — 3600007502: Performed by: INTERNAL MEDICINE

## 2024-10-04 PROCEDURE — 3700000001 HC ADD 15 MINUTES (ANESTHESIA): Performed by: INTERNAL MEDICINE

## 2024-10-04 PROCEDURE — 6360000002 HC RX W HCPCS: Performed by: NURSE ANESTHETIST, CERTIFIED REGISTERED

## 2024-10-04 PROCEDURE — 7100000000 HC PACU RECOVERY - FIRST 15 MIN: Performed by: INTERNAL MEDICINE

## 2024-10-04 PROCEDURE — 7100000010 HC PHASE II RECOVERY - FIRST 15 MIN: Performed by: INTERNAL MEDICINE

## 2024-10-04 RX ORDER — PROPOFOL 10 MG/ML
INJECTION, EMULSION INTRAVENOUS
Status: DISCONTINUED | OUTPATIENT
Start: 2024-10-04 | End: 2024-10-04 | Stop reason: SDUPTHER

## 2024-10-04 RX ORDER — SODIUM CHLORIDE, SODIUM LACTATE, POTASSIUM CHLORIDE, CALCIUM CHLORIDE 600; 310; 30; 20 MG/100ML; MG/100ML; MG/100ML; MG/100ML
INJECTION, SOLUTION INTRAVENOUS CONTINUOUS
Status: DISCONTINUED | OUTPATIENT
Start: 2024-10-04 | End: 2024-10-04 | Stop reason: HOSPADM

## 2024-10-04 RX ORDER — LIDOCAINE HYDROCHLORIDE 20 MG/ML
INJECTION, SOLUTION EPIDURAL; INFILTRATION; INTRACAUDAL; PERINEURAL
Status: DISCONTINUED | OUTPATIENT
Start: 2024-10-04 | End: 2024-10-04 | Stop reason: SDUPTHER

## 2024-10-04 RX ORDER — LIDOCAINE HYDROCHLORIDE 10 MG/ML
1 INJECTION, SOLUTION EPIDURAL; INFILTRATION; INTRACAUDAL; PERINEURAL
Status: DISCONTINUED | OUTPATIENT
Start: 2024-10-04 | End: 2024-10-04 | Stop reason: HOSPADM

## 2024-10-04 RX ADMIN — PROPOFOL 50 MG: 10 INJECTION, EMULSION INTRAVENOUS at 10:05

## 2024-10-04 RX ADMIN — LIDOCAINE HYDROCHLORIDE 50 MG: 20 SOLUTION INTRAVENOUS at 09:55

## 2024-10-04 RX ADMIN — PROPOFOL 40 MG: 10 INJECTION, EMULSION INTRAVENOUS at 09:58

## 2024-10-04 RX ADMIN — SODIUM CHLORIDE, POTASSIUM CHLORIDE, SODIUM LACTATE AND CALCIUM CHLORIDE: 600; 310; 30; 20 INJECTION, SOLUTION INTRAVENOUS at 07:57

## 2024-10-04 RX ADMIN — PROPOFOL 60 MG: 10 INJECTION, EMULSION INTRAVENOUS at 09:55

## 2024-10-04 RX ADMIN — PROPOFOL 50 MG: 10 INJECTION, EMULSION INTRAVENOUS at 10:09

## 2024-10-04 RX ADMIN — PROPOFOL 50 MG: 10 INJECTION, EMULSION INTRAVENOUS at 10:01

## 2024-10-04 ASSESSMENT — PAIN - FUNCTIONAL ASSESSMENT
PAIN_FUNCTIONAL_ASSESSMENT: 0-10
PAIN_FUNCTIONAL_ASSESSMENT: 0-10

## 2024-10-04 ASSESSMENT — PAIN SCALES - GENERAL
PAINLEVEL_OUTOF10: 0
PAINLEVEL_OUTOF10: 0

## 2024-10-04 NOTE — ANESTHESIA POSTPROCEDURE EVALUATION
Department of Anesthesiology  Postprocedure Note    Patient: Florecita Hurt  MRN: [id number]  YOB: 1960  Date of evaluation: 10/4/2024    Procedure Summary       Date: 10/04/24 Room / Location: [facility] ENDO 02 / [facility] ENDOSCOPY    Anesthesia Start: 0949 Anesthesia Stop: 1016    Procedure: COLONOSCOPY DIAGNOSTIC w/ Polypectomy (Abdomen) Diagnosis:       Colon cancer screening      (Colon cancer screening [Z12.11])    Surgeons: Jason Christian MD Responsible Provider: David Lay MD    Anesthesia Type: MAC ASA Status: 3            Anesthesia Type: MAC    Marilynn Phase I: Marilynn Score: 10    Marilynn Phase II: Marilynn Score: 10    Anesthesia Post Evaluation    Patient location during evaluation: PACU  Patient participation: complete - patient participated  Level of consciousness: sleepy but conscious  Pain score: 0  Airway patency: patent  Nausea & Vomiting: no nausea and no vomiting  Cardiovascular status: blood pressure returned to baseline  Respiratory status: acceptable  Hydration status: euvolemic  Pain management: adequate    No notable events documented.

## 2024-10-04 NOTE — ANESTHESIA PRE PROCEDURE
(256 lb 6.4 oz)   09/20/23 114.3 kg (252 lb)   07/11/23 114.8 kg (253 lb)     Body mass index is 45.42 kg/m².    CBC: No results found for: \"WBC\", \"RBC\", \"HGB\", \"HCT\", \"MCV\", \"RDW\", \"PLT\"    CMP: No results found for: \"NA\", \"K\", \"CL\", \"CO2\", \"BUN\", \"CREATININE\", \"GFRAA\", \"AGRATIO\", \"LABGLOM\", \"GLUCOSE\", \"GLU\", \"CALCIUM\", \"BILITOT\", \"ALKPHOS\", \"AST\", \"ALT\"    POC Tests:   Recent Labs     10/04/24  0754   POCGLU 118*       Coags: No results found for: \"PROTIME\", \"INR\", \"APTT\"    HCG (If Applicable): No results found for: \"PREGTESTUR\", \"PREGSERUM\", \"HCG\", \"HCGQUANT\"     ABGs: No results found for: \"PHART\", \"PO2ART\", \"OPA6SJW\", \"ESH3PDD\", \"BEART\", \"Y6LFUMFT\"     Type & Screen (If Applicable):  Lab Results   Component Value Date    ABORH O POS 05/15/2012    LABANTI NEG 05/15/2012       Drug/Infectious Status (If Applicable):  No results found for: \"HIV\", \"HEPCAB\"    COVID-19 Screening (If Applicable): No results found for: \"COVID19\"        Anesthesia Evaluation  Patient summary reviewed and Nursing notes reviewed   no history of anesthetic complications:   Airway: Mallampati: II  TM distance: <3 FB   Neck ROM: full  Mouth opening: > = 3 FB   Dental:    (+) poor dentition  Comment: Multiple missing teeth    Pulmonary:Negative Pulmonary ROS and normal exam                               Cardiovascular:    (+) hypertension:                  Neuro/Psych:   Negative Neuro/Psych ROS              GI/Hepatic/Renal:   (+) morbid obesity          Endo/Other:    (+) DiabetesType II DM.                 Abdominal: normal exam            Vascular: negative vascular ROS.         Other Findings:       Anesthesia Plan      MAC     ASA 3       Induction: intravenous.      Anesthetic plan and risks discussed with patient.                    David Lay MD   10/4/2024

## 2024-10-04 NOTE — H&P
Minutes of Exercise per Session: 10 min   Stress: Not on file   Social Connections: Not on file   Intimate Partner Violence: Not At Risk (9/7/2023)    Humiliation, Afraid, Rape, and Kick questionnaire     Fear of Current or Ex-Partner: No     Emotionally Abused: No     Physically Abused: No     Sexually Abused: No   Housing Stability: Not on file       FHX:   Family History   Problem Relation Age of Onset    Cancer Sister     Breast Cancer Sister     Cancer Sister     Breast Cancer Sister     Cancer Father     Cancer Sister     Breast Cancer Mother     Cancer Mother     Cancer Sister        Allergy:   Allergies   Allergen Reactions    Lisinopril Swelling     Facial swelling       Home Medications:     Prior to Admission medications    Medication Sig Start Date End Date Taking? Authorizing Provider   atorvastatin (LIPITOR) 10 MG tablet Take 1 tablet by mouth daily 7/3/23  Yes Provider, MD Salima   amLODIPine (NORVASC) 5 MG tablet Take 1 tablet by mouth daily 8/19/23  Yes Provider, MD Salima   FARXIGA 5 MG tablet Take 1 tablet by mouth every morning 9/5/23  Yes Provider, Historical, MD   losartan (COZAAR) 100 MG tablet Take 1 tablet by mouth daily 6/26/23  Yes Provider, MD Salima   ergocalciferol (ERGOCALCIFEROL) 1.25 MG (52198 UT) capsule Take 1 capsule by mouth every 7 days Every Friday 8/31/15  Yes Automatic Reconciliation, Ar   hydroCHLOROthiazide (HYDRODIURIL) 25 MG tablet Take 1 tablet by mouth daily (In the afternoon) 8/21/13  Yes Automatic Reconciliation, Ar   meloxicam (MOBIC) 15 MG tablet Take 1 tablet by mouth daily ceived the following from [facility]: Outside name: meloxicam (MOBIC) 15 mg tablet 9/14/15  Yes Automatic Reconciliation, Ar       Review of Systems:     A complete 10 point review of systems was performed and pertinents are as per the HPI. Remainder of the review of systems was negative.       /74   Pulse (!) 104   Temp 98 °F (36.7 °C) (Oral)   Resp 20    Ht 1.6 m (5' 3\")   Wt 116.3 kg (256 lb 6.4 oz)   SpO2 100%   BMI 45.42 kg/m²     Physical Assessment:     General: alert, cooperative, no acute distress, appears stated age.  HEENT: normocephalic, no scleral icterus, moist mucous membranes, EOMs intact, no neck masses noted.  Respiratory: lungs clear to auscultation bilaterally.  Cardiovascular: regular rate and rhythm, no murmurs, rubs or gallops.  Abdomen: normal bowel sounds, soft, non-tender to palpation.  Extremities: no lower extremity edema, no cyanosis or clubbing.  Neuro: alert and oriented x 3; non-focal exam.  Skin: no rashes.  Psych: normal mood and affect.         Jason Christian MD  [facility]  [facility]  Phone: [phone]

## 2024-10-31 ENCOUNTER — HOSPITAL ENCOUNTER (OUTPATIENT)
Facility: HOSPITAL | Age: 64
Discharge: HOME | End: 2024-11-03
Payer: COMMERCIAL

## 2024-10-31 VITALS — WEIGHT: 256.39 LBS | BODY MASS INDEX: 45.43 KG/M2 | HEIGHT: 63 IN

## 2024-10-31 DIAGNOSIS — Z12.31 ENCOUNTER FOR SCREENING MAMMOGRAM FOR HIGH-RISK PATIENT: ICD-10-CM

## 2024-10-31 PROCEDURE — 77063 BREAST TOMOSYNTHESIS BI: CPT

## 2025-01-07 ENCOUNTER — TRANSCRIBE ORDERS (OUTPATIENT)
Facility: HOSPITAL | Age: 65
End: 2025-01-07

## 2025-01-07 DIAGNOSIS — M85.89 OTHER SPECIFIED DISORDERS OF BONE DENSITY AND STRUCTURE, MULTIPLE SITES: Primary | ICD-10-CM

## 2025-01-09 ENCOUNTER — HOSPITAL ENCOUNTER (OUTPATIENT)
Facility: HOSPITAL | Age: 65
Discharge: HOME | End: 2025-01-12
Payer: MEDICARE

## 2025-01-09 DIAGNOSIS — R92.8 ABNORMAL MAMMOGRAM OF RIGHT BREAST: ICD-10-CM

## 2025-01-09 PROCEDURE — 76642 ULTRASOUND BREAST LIMITED: CPT

## 2025-01-09 PROCEDURE — G0279 TOMOSYNTHESIS, MAMMO: HCPCS

## 2025-02-05 ENCOUNTER — HOSPITAL ENCOUNTER (OUTPATIENT)
Facility: HOSPITAL | Age: 65
Discharge: HOME | End: 2025-02-08
Payer: MEDICARE

## 2025-02-05 DIAGNOSIS — N63.0 BREAST MASS IN FEMALE: ICD-10-CM

## 2025-02-05 DIAGNOSIS — R92.8 ABNORMAL MAMMOGRAM: ICD-10-CM

## 2025-02-05 DIAGNOSIS — M85.89 OTHER SPECIFIED DISORDERS OF BONE DENSITY AND STRUCTURE, MULTIPLE SITES: ICD-10-CM

## 2025-02-05 PROCEDURE — 77065 DX MAMMO INCL CAD UNI: CPT

## 2025-02-05 PROCEDURE — 6360000002 HC RX W HCPCS: Performed by: NURSE PRACTITIONER

## 2025-02-05 PROCEDURE — 77080 DXA BONE DENSITY AXIAL: CPT

## 2025-02-05 PROCEDURE — 88305 TISSUE EXAM BY PATHOLOGIST: CPT

## 2025-02-05 PROCEDURE — 2720000010 US BREAST BIOPSY W LOC DEVICE 1ST LESION RIGHT

## 2025-02-05 RX ORDER — LIDOCAINE HYDROCHLORIDE 10 MG/ML
10 INJECTION, SOLUTION EPIDURAL; INFILTRATION; INTRACAUDAL; PERINEURAL ONCE
Status: COMPLETED | OUTPATIENT
Start: 2025-02-05 | End: 2025-02-05

## 2025-02-05 RX ORDER — LIDOCAINE HYDROCHLORIDE AND EPINEPHRINE 10; 10 MG/ML; UG/ML
20 INJECTION, SOLUTION INFILTRATION; PERINEURAL ONCE
Status: COMPLETED | OUTPATIENT
Start: 2025-02-05 | End: 2025-02-05

## 2025-02-05 RX ADMIN — LIDOCAINE HYDROCHLORIDE 5 ML: 10 INJECTION, SOLUTION EPIDURAL; INFILTRATION; INTRACAUDAL; PERINEURAL at 08:59

## 2025-02-05 RX ADMIN — LIDOCAINE HYDROCHLORIDE,EPINEPHRINE BITARTRATE 15 ML: 10; .01 INJECTION, SOLUTION INFILTRATION; PERINEURAL at 09:00

## 2025-02-10 ENCOUNTER — TELEPHONE (OUTPATIENT)
Facility: HOSPITAL | Age: 65
End: 2025-02-10

## 2025-03-05 ENCOUNTER — OFFICE VISIT (OUTPATIENT)
Age: 65
End: 2025-03-05

## 2025-03-05 VITALS
WEIGHT: 265.8 LBS | HEIGHT: 62 IN | OXYGEN SATURATION: 98 % | HEART RATE: 57 BPM | SYSTOLIC BLOOD PRESSURE: 130 MMHG | BODY MASS INDEX: 48.91 KG/M2 | DIASTOLIC BLOOD PRESSURE: 80 MMHG | TEMPERATURE: 97.1 F | RESPIRATION RATE: 16 BRPM

## 2025-03-05 DIAGNOSIS — Z80.3 FAMILY HISTORY OF BREAST CANCER IN SISTER: ICD-10-CM

## 2025-03-05 DIAGNOSIS — D24.1 PAPILLOMA OF RIGHT BREAST: Primary | ICD-10-CM

## 2025-03-05 DIAGNOSIS — Z80.3 FAMILY HISTORY OF BREAST CANCER IN MOTHER: ICD-10-CM

## 2025-03-05 ASSESSMENT — ENCOUNTER SYMPTOMS
SHORTNESS OF BREATH: 0
CHEST TIGHTNESS: 0

## 2025-03-05 NOTE — PROGRESS NOTES
CC:   Chief Complaint   Patient presents with    New Patient     Right breast papilloma         Assessment:    ICD-10-CM    1. Papilloma of right breast  D24.1 SCHEDULE SURGERY      2. Family history of breast cancer in mother  Z80.3       3. Family history of breast cancer in sister  Z80.3           Plan: We discussed the pathology results of intraductal papilloma in detail. We discussed that they are not malignant. They may cause mass and/or bloody nipple discharge.  Traditional recommendation is for excision, though with larger core biopsies, commonly the intraductal papilloma has already been entirely removed. Risks, benefits and options of conservative management and excision were both discussed. Conservative management was described to include observation with serial exams and imaging. Excision would include localization. Ms. Florecita Hurt prefers right breast localized excisional biopsy.  The risks and benefits of the procedure were reviewed including infection, bleeding, need for repeat procedure, injury to surrounding structures, poor cosmetic outcome.  Questions were answered.    We also discussed high risk screening going forward including alternating MRI and mammogram every 6 months.         HPI:  Florecita Hurt is a 64 y.o. female who is referred for papilloma of the right breast that was identified on screening mammogram.  She reports no changes to self breast exams, lumps, pain or nipple discharge. She has strong family history of breast cancer with 2 sisters age 30. Mother also diagnosed with breast cancer age unknown. She states no one has undergone genetic testing and she is not interested in this as well.     Allergies:  Allergies   Allergen Reactions    Lisinopril Swelling     Facial swelling       Medication Review:  Current Outpatient Medications on File Prior to Visit   Medication Sig Dispense Refill    atorvastatin (LIPITOR) 10 MG tablet Take 1 tablet by mouth daily      amLODIPine

## 2025-03-05 NOTE — PROGRESS NOTES
Florecita Hurt is a 64 y.o. female (: 1960)     Chief Complaint   Patient presents with    New Patient     Right breast papilloma        Medication list and allergies have been reviewed with Florecita Hurt and updated as of today's date.     I have gone over all Medical, Surgical and Social History with Florecita Hurt and updated/added the information accordingly.

## 2025-04-15 ENCOUNTER — PREP FOR PROCEDURE (OUTPATIENT)
Age: 65
End: 2025-04-15

## 2025-04-15 DIAGNOSIS — D24.1 PAPILLOMA OF RIGHT BREAST: ICD-10-CM

## 2025-04-17 ENCOUNTER — HOSPITAL ENCOUNTER (OUTPATIENT)
Facility: HOSPITAL | Age: 65
Discharge: HOME | End: 2025-04-20
Payer: MEDICARE

## 2025-04-17 DIAGNOSIS — R92.8 ABNORMAL ULTRASOUND OF BREAST: ICD-10-CM

## 2025-04-17 PROCEDURE — 6360000002 HC RX W HCPCS: Performed by: NURSE PRACTITIONER

## 2025-04-17 PROCEDURE — 19281 PERQ DEVICE BREAST 1ST IMAG: CPT

## 2025-04-17 RX ORDER — LIDOCAINE HYDROCHLORIDE 10 MG/ML
10 INJECTION, SOLUTION INFILTRATION; PERINEURAL ONCE
Status: COMPLETED | OUTPATIENT
Start: 2025-04-17 | End: 2025-04-17

## 2025-04-17 RX ADMIN — LIDOCAINE HYDROCHLORIDE 9 ML: 10 INJECTION, SOLUTION EPIDURAL; INFILTRATION; INTRACAUDAL; PERINEURAL at 08:58

## 2025-04-17 NOTE — PROGRESS NOTES
Instructions for your procedure at [facility]      Today's Date: 4/17/2025      Patient's Name: Florecita Hurt      Procedure Date: April 25, 2025        Please enter the main entrance of the hospital and check-in at the  located in the lobby.      Do NOT eat or drink anything, including candy, gum, or ice chips after midnight prior to your procedure, unless it is part of your prep.  Brush your teeth before coming to the hospital.You may swish with water, but do not swallow.  No smoking/Vaping/E-Cigarettes 24 hours prior to the day of procedure.  No alcohol 24 hours prior to the day of procedure.  No recreational drugs for one week prior to the day of procedure.  Bring Photo ID, Insurance information, and Co-pay if required on day of procedure.  Bring in pertinent legal documents, such as, Medical Power of , DNR, Advance Directive, etc.  Leave all other valuables, including money/purse, weapons at home.  Remove jewelry, including ALL body piercings, nail polish, acrylic nails, and makeup (including mascara); no lotions, powders, deodorant, and/or perfume/cologne/after shave on the skin.  Glasses and dentures may be worn to the hospital.  They must be removed prior to procedure. Please bring case/container for glasses or dentures.  11. Contacts should not be worn on day of procedure.   12. Call the office ([phone]) if you have symptoms of a cold or illness within 24-48 hours prior to your procedure.   13. AN ADULT (relative or friend 18 years or older) MUST DRIVE YOU HOME AFTER YOUR PROCEDURE.   14. Please make arrangements for a responsible adult (18 years or older) to be with you for 24 hours after your procedure.   15. TWO VISITORS will be allowed in the waiting area during your procedure.       Special Instructions:      Bring list of CURRENT medications.  Follow instructions from the office regarding Bowel Prep, Vitamins, Iron, Blood Thinners, Insulin,  Seizure, and Blood Pressure/Heart medications.    If you have a history of recreational drug use, you may be required to submit a urine sample for drug testing the day of your procedure, as some recreational drugs can interact with anesthetics and increase your surgical risk.    Any questions regarding prep, please call the office at [phone].    For any questions or concerns on the day of procedure, please call the Endo Suite at [phone].    These surgical instructions were reviewed with Florecita Hurt during the PAT phone call.

## 2025-04-24 ENCOUNTER — ANESTHESIA EVENT (OUTPATIENT)
Facility: HOSPITAL | Age: 65
End: 2025-04-24
Payer: MEDICARE

## 2025-04-24 NOTE — DISCHARGE INSTRUCTIONS
Post Operative Discharge Instructions    No driving for 24 hours after surgery and off of prescription pain medication.    Avoid activities that bump or cause jarring movements at the surgical site for 10 days.    No lifting more than 10-15 pounds for 2 weeks after surgery or until cleared for activity at your follow up.    Walking is encouraged after surgery.    Stairs are ok to climb.      DIET:    Diet as tolerated. Start with liquids then advance your diet based on how you feel.    No alcoholic beverages for 24 hours after surgery or while on antibiotics or pain mdications.    Drink plenty of water.        MEDICATIONS:    Use daily stool softners (over the counter such as Colace or Senekot) while on pain medications.     Resume pre-operative medications. If you are on any blood thinners see special instructions below.    Use prescriptions given or Tylenol, Ibuprofen as needed for pain.    Do not use more than 4000mg of Tylenol (acetaminophen) per day. Be aware this may be  in your prescription medication as well.    Be aware narcotic prescriptions are tightly controlled in the [facility]. If requiring more than one refill, a follow up appointment will be required.      WOUND CARE:      You have steri strips on your incision, you may shower in 24 hours and pat dry. Leave steri strips on until they fall off on their own. Wear your surgical bra or own supportive bra at all times until cleared at your follow up     Do not tub bathe, swim, or soak incisions until cleared to do so at your follow up.    Ice bag to the affected area; 20 minutes on and 20 minutes off if desired.      FOLLOW UP CARE:    You should have an appointment scheduled within 14 days after surgery. If this is not yet scheduled, call the office.  Any forms that you need filled out regarding your medical care can be brought to the office at follow up appointment of faxed to: [phone]        CALL DOCTOR IF:  Temperature is over 101 degrees, a

## 2025-04-25 ENCOUNTER — HOSPITAL ENCOUNTER (OUTPATIENT)
Facility: HOSPITAL | Age: 65
Setting detail: OUTPATIENT SURGERY
Discharge: HOME | End: 2025-04-25
Attending: SURGERY | Admitting: SURGERY
Payer: MEDICARE

## 2025-04-25 ENCOUNTER — APPOINTMENT (OUTPATIENT)
Facility: HOSPITAL | Age: 65
End: 2025-04-25
Attending: SURGERY
Payer: MEDICARE

## 2025-04-25 ENCOUNTER — ANESTHESIA (OUTPATIENT)
Facility: HOSPITAL | Age: 65
End: 2025-04-25
Payer: MEDICARE

## 2025-04-25 VITALS
TEMPERATURE: 97.3 F | WEIGHT: 264.8 LBS | RESPIRATION RATE: 18 BRPM | HEART RATE: 71 BPM | BODY MASS INDEX: 46.92 KG/M2 | OXYGEN SATURATION: 95 % | SYSTOLIC BLOOD PRESSURE: 136 MMHG | HEIGHT: 63 IN | DIASTOLIC BLOOD PRESSURE: 77 MMHG

## 2025-04-25 DIAGNOSIS — D24.1 PAPILLOMA OF RIGHT BREAST: ICD-10-CM

## 2025-04-25 DIAGNOSIS — I10 ESSENTIAL HYPERTENSION: Primary | ICD-10-CM

## 2025-04-25 LAB
EST. AVERAGE GLUCOSE BLD GHB EST-MCNC: 131 MG/DL
GLUCOSE BLD STRIP.AUTO-MCNC: 127 MG/DL (ref 70–110)
GLUCOSE BLD STRIP.AUTO-MCNC: 96 MG/DL (ref 70–110)
HBA1C MFR BLD: 6.2 % (ref 4.2–5.6)

## 2025-04-25 PROCEDURE — 7100000001 HC PACU RECOVERY - ADDTL 15 MIN: Performed by: SURGERY

## 2025-04-25 PROCEDURE — 6370000000 HC RX 637 (ALT 250 FOR IP): Performed by: NURSE ANESTHETIST, CERTIFIED REGISTERED

## 2025-04-25 PROCEDURE — 94761 N-INVAS EAR/PLS OXIMETRY MLT: CPT

## 2025-04-25 PROCEDURE — 88307 TISSUE EXAM BY PATHOLOGIST: CPT

## 2025-04-25 PROCEDURE — 6360000002 HC RX W HCPCS: Performed by: NURSE ANESTHETIST, CERTIFIED REGISTERED

## 2025-04-25 PROCEDURE — 6360000002 HC RX W HCPCS: Performed by: SURGERY

## 2025-04-25 PROCEDURE — 7100000000 HC PACU RECOVERY - FIRST 15 MIN: Performed by: SURGERY

## 2025-04-25 PROCEDURE — 83036 HEMOGLOBIN GLYCOSYLATED A1C: CPT

## 2025-04-25 PROCEDURE — 82962 GLUCOSE BLOOD TEST: CPT

## 2025-04-25 PROCEDURE — 3700000001 HC ADD 15 MINUTES (ANESTHESIA): Performed by: SURGERY

## 2025-04-25 PROCEDURE — 6370000000 HC RX 637 (ALT 250 FOR IP): Performed by: ANESTHESIOLOGY

## 2025-04-25 PROCEDURE — 3600000002 HC SURGERY LEVEL 2 BASE: Performed by: SURGERY

## 2025-04-25 PROCEDURE — 2500000003 HC RX 250 WO HCPCS: Performed by: SURGERY

## 2025-04-25 PROCEDURE — 2709999900 HC NON-CHARGEABLE SUPPLY: Performed by: SURGERY

## 2025-04-25 PROCEDURE — 3600000012 HC SURGERY LEVEL 2 ADDTL 15MIN: Performed by: SURGERY

## 2025-04-25 PROCEDURE — 19125 EXCISION BREAST LESION: CPT | Performed by: SURGERY

## 2025-04-25 PROCEDURE — 2580000003 HC RX 258: Performed by: NURSE ANESTHETIST, CERTIFIED REGISTERED

## 2025-04-25 PROCEDURE — 76098 X-RAY EXAM SURGICAL SPECIMEN: CPT

## 2025-04-25 PROCEDURE — 2720000010 HC SURG SUPPLY STERILE: Performed by: SURGERY

## 2025-04-25 PROCEDURE — 7100000011 HC PHASE II RECOVERY - ADDTL 15 MIN: Performed by: SURGERY

## 2025-04-25 PROCEDURE — 3700000000 HC ANESTHESIA ATTENDED CARE: Performed by: SURGERY

## 2025-04-25 PROCEDURE — 7100000010 HC PHASE II RECOVERY - FIRST 15 MIN: Performed by: SURGERY

## 2025-04-25 RX ORDER — NALOXONE HYDROCHLORIDE 0.4 MG/ML
INJECTION, SOLUTION INTRAMUSCULAR; INTRAVENOUS; SUBCUTANEOUS PRN
Status: DISCONTINUED | OUTPATIENT
Start: 2025-04-25 | End: 2025-04-25 | Stop reason: HOSPADM

## 2025-04-25 RX ORDER — SODIUM CHLORIDE 0.9 % (FLUSH) 0.9 %
5-40 SYRINGE (ML) INJECTION PRN
Status: DISCONTINUED | OUTPATIENT
Start: 2025-04-25 | End: 2025-04-25 | Stop reason: HOSPADM

## 2025-04-25 RX ORDER — FAMOTIDINE 20 MG/1
20 TABLET, FILM COATED ORAL ONCE
Status: COMPLETED | OUTPATIENT
Start: 2025-04-25 | End: 2025-04-25

## 2025-04-25 RX ORDER — DEXAMETHASONE SODIUM PHOSPHATE 4 MG/ML
INJECTION, SOLUTION INTRA-ARTICULAR; INTRALESIONAL; INTRAMUSCULAR; INTRAVENOUS; SOFT TISSUE
Status: DISCONTINUED | OUTPATIENT
Start: 2025-04-25 | End: 2025-04-25 | Stop reason: SDUPTHER

## 2025-04-25 RX ORDER — LIDOCAINE HYDROCHLORIDE 10 MG/ML
1 INJECTION, SOLUTION EPIDURAL; INFILTRATION; INTRACAUDAL; PERINEURAL
Status: DISCONTINUED | OUTPATIENT
Start: 2025-04-25 | End: 2025-04-25 | Stop reason: HOSPADM

## 2025-04-25 RX ORDER — KETOROLAC TROMETHAMINE 15 MG/ML
INJECTION, SOLUTION INTRAMUSCULAR; INTRAVENOUS
Status: DISCONTINUED | OUTPATIENT
Start: 2025-04-25 | End: 2025-04-25 | Stop reason: SDUPTHER

## 2025-04-25 RX ORDER — FENTANYL CITRATE 50 UG/ML
INJECTION, SOLUTION INTRAMUSCULAR; INTRAVENOUS
Status: DISCONTINUED | OUTPATIENT
Start: 2025-04-25 | End: 2025-04-25 | Stop reason: SDUPTHER

## 2025-04-25 RX ORDER — SODIUM CHLORIDE 0.9 % (FLUSH) 0.9 %
5-40 SYRINGE (ML) INJECTION EVERY 12 HOURS SCHEDULED
Status: DISCONTINUED | OUTPATIENT
Start: 2025-04-25 | End: 2025-04-25 | Stop reason: HOSPADM

## 2025-04-25 RX ORDER — FENTANYL CITRATE 50 UG/ML
25 INJECTION, SOLUTION INTRAMUSCULAR; INTRAVENOUS EVERY 5 MIN PRN
Status: DISCONTINUED | OUTPATIENT
Start: 2025-04-25 | End: 2025-04-25 | Stop reason: HOSPADM

## 2025-04-25 RX ORDER — SODIUM CHLORIDE 9 MG/ML
INJECTION, SOLUTION INTRAVENOUS PRN
Status: DISCONTINUED | OUTPATIENT
Start: 2025-04-25 | End: 2025-04-25 | Stop reason: HOSPADM

## 2025-04-25 RX ORDER — PHENYLEPHRINE HCL IN 0.9% NACL 1 MG/10 ML
SYRINGE (ML) INTRAVENOUS
Status: DISCONTINUED | OUTPATIENT
Start: 2025-04-25 | End: 2025-04-25 | Stop reason: SDUPTHER

## 2025-04-25 RX ORDER — MIDAZOLAM HYDROCHLORIDE 1 MG/ML
INJECTION, SOLUTION INTRAMUSCULAR; INTRAVENOUS
Status: DISCONTINUED | OUTPATIENT
Start: 2025-04-25 | End: 2025-04-25 | Stop reason: SDUPTHER

## 2025-04-25 RX ORDER — SODIUM CHLORIDE, SODIUM LACTATE, POTASSIUM CHLORIDE, CALCIUM CHLORIDE 600; 310; 30; 20 MG/100ML; MG/100ML; MG/100ML; MG/100ML
INJECTION, SOLUTION INTRAVENOUS CONTINUOUS
Status: DISCONTINUED | OUTPATIENT
Start: 2025-04-25 | End: 2025-04-25 | Stop reason: HOSPADM

## 2025-04-25 RX ORDER — HYDROCODONE BITARTRATE AND ACETAMINOPHEN 5; 325 MG/1; MG/1
1 TABLET ORAL EVERY 4 HOURS PRN
Qty: 18 TABLET | Refills: 0 | Status: SHIPPED | OUTPATIENT
Start: 2025-04-25 | End: 2025-04-28

## 2025-04-25 RX ORDER — ONDANSETRON 2 MG/ML
4 INJECTION INTRAMUSCULAR; INTRAVENOUS
Status: DISCONTINUED | OUTPATIENT
Start: 2025-04-25 | End: 2025-04-25 | Stop reason: HOSPADM

## 2025-04-25 RX ORDER — DEXTROSE MONOHYDRATE 100 MG/ML
INJECTION, SOLUTION INTRAVENOUS CONTINUOUS PRN
Status: DISCONTINUED | OUTPATIENT
Start: 2025-04-25 | End: 2025-04-25 | Stop reason: HOSPADM

## 2025-04-25 RX ORDER — GLUCAGON 1 MG
1 KIT INJECTION PRN
Status: DISCONTINUED | OUTPATIENT
Start: 2025-04-25 | End: 2025-04-25 | Stop reason: HOSPADM

## 2025-04-25 RX ORDER — PROMETHAZINE HYDROCHLORIDE 12.5 MG/1
12.5 TABLET ORAL
Status: COMPLETED | OUTPATIENT
Start: 2025-04-25 | End: 2025-04-25

## 2025-04-25 RX ORDER — ONDANSETRON 2 MG/ML
INJECTION INTRAMUSCULAR; INTRAVENOUS
Status: DISCONTINUED | OUTPATIENT
Start: 2025-04-25 | End: 2025-04-25 | Stop reason: SDUPTHER

## 2025-04-25 RX ADMIN — FAMOTIDINE 20 MG: 20 TABLET, FILM COATED ORAL at 07:12

## 2025-04-25 RX ADMIN — ONDANSETRON 4 MG: 2 INJECTION, SOLUTION INTRAMUSCULAR; INTRAVENOUS at 09:10

## 2025-04-25 RX ADMIN — FENTANYL CITRATE 25 MCG: 50 INJECTION INTRAMUSCULAR; INTRAVENOUS at 08:55

## 2025-04-25 RX ADMIN — SODIUM CHLORIDE, SODIUM LACTATE, POTASSIUM CHLORIDE, AND CALCIUM CHLORIDE: .6; .31; .03; .02 INJECTION, SOLUTION INTRAVENOUS at 07:20

## 2025-04-25 RX ADMIN — FENTANYL CITRATE 25 MCG: 50 INJECTION INTRAMUSCULAR; INTRAVENOUS at 09:00

## 2025-04-25 RX ADMIN — DEXAMETHASONE SODIUM PHOSPHATE 4 MG: 4 INJECTION INTRA-ARTICULAR; INTRALESIONAL; INTRAMUSCULAR; INTRAVENOUS; SOFT TISSUE at 09:10

## 2025-04-25 RX ADMIN — Medication 100 MCG: at 09:08

## 2025-04-25 RX ADMIN — PROMETHAZINE HYDROCHLORIDE 12.5 MG: 12.5 TABLET ORAL at 07:12

## 2025-04-25 RX ADMIN — KETOROLAC TROMETHAMINE 15 MG: 15 INJECTION, SOLUTION INTRAMUSCULAR; INTRAVENOUS at 09:10

## 2025-04-25 RX ADMIN — MIDAZOLAM 2 MG: 1 INJECTION, SOLUTION INTRAMUSCULAR; INTRAVENOUS at 08:47

## 2025-04-25 RX ADMIN — WATER 3000 MG: 1 INJECTION INTRAMUSCULAR; INTRAVENOUS; SUBCUTANEOUS at 08:56

## 2025-04-25 ASSESSMENT — PAIN DESCRIPTION - ORIENTATION
ORIENTATION: RIGHT

## 2025-04-25 ASSESSMENT — PAIN - FUNCTIONAL ASSESSMENT
PAIN_FUNCTIONAL_ASSESSMENT: ACTIVITIES ARE NOT PREVENTED
PAIN_FUNCTIONAL_ASSESSMENT: 0-10

## 2025-04-25 ASSESSMENT — PAIN DESCRIPTION - PAIN TYPE
TYPE: SURGICAL PAIN;OTHER (COMMENT)

## 2025-04-25 ASSESSMENT — PAIN SCALES - GENERAL
PAINLEVEL_OUTOF10: 0

## 2025-04-25 ASSESSMENT — PAIN DESCRIPTION - LOCATION
LOCATION: BREAST

## 2025-04-25 NOTE — H&P
New Patient       Right breast papilloma          Assessment:      ICD-10-CM     1. Papilloma of right breast  D24.1 SCHEDULE SURGERY       2. Family history of breast cancer in mother  Z80.3         3. Family history of breast cancer in sister  Z80.3               Plan: We discussed the pathology results of intraductal papilloma in detail. We discussed that they are not malignant. They may cause mass and/or bloody nipple discharge.  Traditional recommendation is for excision, though with larger core biopsies, commonly the intraductal papilloma has already been entirely removed. Risks, benefits and options of conservative management and excision were both discussed. Conservative management was described to include observation with serial exams and imaging. Excision would include localization. Ms. Florecita Hurt prefers right breast localized excisional biopsy.  The risks and benefits of the procedure were reviewed including infection, bleeding, need for repeat procedure, injury to surrounding structures, poor cosmetic outcome.  Questions were answered.     We also discussed high risk screening going forward including alternating MRI and mammogram every 6 months.            HPI:  Florecita Hurt is a 64 y.o. female who is referred for papilloma of the right breast that was identified on screening mammogram.  She reports no changes to self breast exams, lumps, pain or nipple discharge. She has strong family history of breast cancer with 2 sisters age 30. Mother also diagnosed with breast cancer age unknown. She states no one has undergone genetic testing and she is not interested in this as well.      Allergies:  Allergies         Allergies   Allergen Reactions    Lisinopril Swelling       Facial swelling            Medication Review:  Medications Ordered Prior to Encounter          Current Outpatient Medications on File Prior to Visit   Medication Sig Dispense Refill    atorvastatin (LIPITOR) 10 MG tablet  rate.   Pulmonary:      Effort: Pulmonary effort is normal.   Chest:   Breasts:     Right: Normal. No swelling, bleeding, inverted nipple, mass, nipple discharge, skin change or tenderness.      Left: Normal. No swelling, bleeding, inverted nipple, mass, nipple discharge, skin change or tenderness.   Lymphadenopathy:      Upper Body:      Right upper body: No supraclavicular, axillary or pectoral adenopathy.      Left upper body: No supraclavicular, axillary or pectoral adenopathy.   Neurological:      General: No focal deficit present.      Mental Status: She is alert and oriented to person, place, and time. Mental status is at baseline.   Psychiatric:         Mood and Affect: Mood normal.         Behavior: Behavior normal.         Thought Content: Thought content normal.         Judgment: Judgment normal.         I have reviewed the information entered by the clinical staff and/or patient and verified it as accurate or edited where necessary.      Electronically signed by:     Daiana Browne DO, MPH

## 2025-04-25 NOTE — ANESTHESIA POSTPROCEDURE EVALUATION
Department of Anesthesiology  Postprocedure Note    Patient: Florecita Hurt  MRN: [id number]  YOB: 1960  Date of evaluation: 4/25/2025    Procedure Summary       Date: 04/25/25 Room / Location: [facility] MAIN 01 / [facility] MAIN OR    Anesthesia Start: 0847 Anesthesia Stop: 0938    Procedure: Right breast localized excisional biopsy (Right: Breast) Diagnosis:       Papilloma of right breast      (Papilloma of right breast [D24.1])    Surgeons: Daiana Browne DO Responsible Provider: Frank Major Jr., MD    Anesthesia Type: General ASA Status: 3            Anesthesia Type: General    Marilynn Phase I: Marilynn Score: 10    Marilynn Phase II: Marilynn Score: 10    Anesthesia Post Evaluation    Patient location during evaluation: bedside  Airway patency: patent  Cardiovascular status: hemodynamically stable  Respiratory status: acceptable  Hydration status: stable  Pain management: adequate    No notable events documented.

## 2025-04-25 NOTE — OP NOTE
Operative Note      Patient: Florecita Hurt  YOB: 1960  MRN: [id number]    Date of Procedure: 4/25/2025    Pre-Op Diagnosis Codes:      * Papilloma of right breast [D24.1]    Post-Op Diagnosis: Same       Procedure(s):  Right breast localized excisional biopsy    Surgeon(s):  Daiana Browne DO    Assistant:   Surgical Assistant: Sofya Rosenberg    Anesthesia: General    Estimated Blood Loss (mL): Minimal    Complications: None    Specimens:   ID Type Source Tests Collected by Time Destination   1 :  Tissue Tissue  Daiana Browne DO 4/25/2025 0906        Implants:  * No implants in log *      Drains: * No LDAs found *    Findings:  Infection Present At Time Of Surgery (PATOS) (choose all levels that have infection present):  No infection present  Other Findings: see dictation     Detailed Description of Procedure:   After informed consent was obtained the patient was taken to the operating room and placed in the supine position.  General anesthesia was administered by the anesthetist to titrate to effect.  The right breast was prepped and draped in the usual sterile fashion and a time out procedure was performed. Next the localizer was used to identify the SHAKIRA in the breast. Local anesthesia was used to anesthetize the skin. Using a 15 blade scalpel an incision was made along the areola. Using the localizer probe to guide dissection bovie was used to dissect through the breast tissue  to the shortest distance identified by the probe. An blessing clamped was then used to grasp the breast tissue and probe confirmed the SHAKIRA within the specimen. Circumferential dissection was performed. The specimen was then marked single short superior, single long lateral, double long anterior and sent of the field. Faxitron was used to confirm the SHAKIRA and clip were in the specimen with good margin. Clips were placed in the bed of the wound. The wound was irrigated and suctioned dry and found to be  hemostatic. The deep breast tissue was closed with 2-0 vicryl suture. The deep dermis was then re -approximated with 3-0 vicryl in a simple interrupted fashion and skin was closed with 4-0 monocryl in a subcuticular fashion. Sterile dressing was applied. The patient was subsequently extubated, tolerated the procedure well and sent to recovery in stable condition.     Electronically signed by Daiana Browne DO on 4/25/2025 at 9:11 AM

## 2025-05-06 NOTE — PROGRESS NOTES
Florecita Hurt is a 64 y.o. female (: 1960)    Chief Complaint   Patient presents with    Post-Op Check     Right breast excisional biopsy        Medication list and allergies have been reviewed with Florecita Hurt and updated as of today's date.     I have gone over all Medical, Surgical and Social History with Florecita Hurt and updated/added the information accordingly.     1. Have you been to the ER, urgent care clinic since your last visit?  Hospitalized since your last visit?No    2. Have you seen or consulted any other health care providers outside of the [facility] since your last visit?  Include any pap smears or colon screening. No

## 2025-05-07 ENCOUNTER — OFFICE VISIT (OUTPATIENT)
Age: 65
End: 2025-05-07

## 2025-05-07 VITALS
HEIGHT: 63 IN | HEART RATE: 78 BPM | OXYGEN SATURATION: 98 % | BODY MASS INDEX: 47.48 KG/M2 | TEMPERATURE: 96.9 F | WEIGHT: 268 LBS | RESPIRATION RATE: 20 BRPM | SYSTOLIC BLOOD PRESSURE: 130 MMHG | DIASTOLIC BLOOD PRESSURE: 84 MMHG

## 2025-05-07 DIAGNOSIS — D24.1 PAPILLOMA OF RIGHT BREAST: Primary | ICD-10-CM

## 2025-05-07 DIAGNOSIS — Z80.3 FAMILY HISTORY OF BREAST CANCER IN MOTHER: ICD-10-CM

## 2025-05-07 DIAGNOSIS — Z80.3 FAMILY HISTORY OF BREAST CANCER IN SISTER: ICD-10-CM

## 2025-05-07 PROCEDURE — 99024 POSTOP FOLLOW-UP VISIT: CPT | Performed by: SURGERY

## 2025-05-07 NOTE — PROGRESS NOTES
CC:   Chief Complaint   Patient presents with    Post-Op Check     Right breast excisional biopsy 04/25        Assessment:    ICD-10-CM    1. Papilloma of right breast  D24.1       2. Family history of breast cancer in mother  Z80.3       3. Family history of breast cancer in sister  Z80.3           Plan: I reviewed the pathology report with the patient demonstrating benign papilloma with no atypia. She is doing well despite falling on her breast after surgery. She would like to continue with our high risk breast screening every 6 months alternating with mammogram and MRI due to family history. I encouraged her to continue to perform self breast exams monthly and notify us of any changes to her breasts including skin changes, lumps, pain or nipple discharge. Follow up in 6 months or sooner should she have questions or concerns. She agrees with this plan.         HPI:  Florecita Hurt is a 64 y.o. female who is status post right breast excisional biopsy on 4/25/2024.  She states that she fell the other day on her breast when she tripped and did have a small amount of bleeding from the incision but that has stopped and she has been keeping the area covered with a bandage.    Allergies:  Allergies   Allergen Reactions    Lisinopril Swelling     Facial swelling       Medication Review:  Current Outpatient Medications on File Prior to Visit   Medication Sig Dispense Refill    atorvastatin (LIPITOR) 10 MG tablet Take 1 tablet by mouth daily      amLODIPine (NORVASC) 5 MG tablet Take 1 tablet by mouth daily      FARXIGA 5 MG tablet Take 1 tablet by mouth every morning      losartan (COZAAR) 100 MG tablet Take 1 tablet by mouth daily      ergocalciferol (ERGOCALCIFEROL) 1.25 MG (70239 UT) capsule Take 1 capsule by mouth every 7 days Every Friday      hydroCHLOROthiazide (HYDRODIURIL) 25 MG tablet Take 1 tablet by mouth daily (In the afternoon)      meloxicam (MOBIC) 15 MG tablet Take 1 tablet by mouth daily ceived the

## (undated) DEVICE — ENDOSCOPY PUMP TUBING/ CAP SET: Brand: ERBE

## (undated) DEVICE — CATHETER SUCT TR FL TIP 14FR W/ O CTRL

## (undated) DEVICE — STRIP SKIN CLSR W1XL5IN NYLON REINF CURAD STERIL

## (undated) DEVICE — NEEDLE HYPO 25GA L1.5IN BVL ORIENTED ECLIPSE

## (undated) DEVICE — SUTURE MONOCRYL SZ 4-0 L27IN ABSRB UD L24MM PS-1 3/8 CIR PRIM Y935H

## (undated) DEVICE — SYRINGE MED 25GA 3ML L5/8IN SUBQ PLAS W/ DETACH NDL SFTY

## (undated) DEVICE — APPLICATOR MEDICATED 26 CC SOLUTION HI LT ORNG CHLORAPREP

## (undated) DEVICE — COVER,LIGHT HANDLE,FLX,1/PK: Brand: MEDLINE INDUSTRIES, INC.

## (undated) DEVICE — APPLIER CLP L9.38IN M LIG TI DISP STR RNG HNDL LIGACLP

## (undated) DEVICE — CANNULA NSL AD TBNG L14FT STD PVC O2 CRV CONN NONFLARED NSL

## (undated) DEVICE — GLOVE SURG SZ 6 CRM LTX FREE POLYISOPRENE POLYMER BEAD ANTI

## (undated) DEVICE — BRA COMPR 3XL NYL LYCRA SPANDEX CURAD

## (undated) DEVICE — TRAP EVAC NO 5500 DISPOS-A-TRAP

## (undated) DEVICE — GAMMEX® NON-LATEX PI UNDERGLOVE SIZE 6.5, STERILE POLYISOPRENE POWDER-FREE SURGICAL GLOVE: Brand: GAMMEX

## (undated) DEVICE — SYRINGE 20ML LL S/C 50

## (undated) DEVICE — MAJOR PLASTIC-LF: Brand: MEDLINE INDUSTRIES, INC.

## (undated) DEVICE — SYRINGE MED 10ML LUERLOCK TIP W/O SFTY DISP

## (undated) DEVICE — 48" PROBE COVER W/GEL, ULTRASOUND, STERILE: Brand: SITE-RITE

## (undated) DEVICE — LINER SUCT CANSTR 3000CC PLAS SFT PRE ASSEMB W/OUT TBNG W/

## (undated) DEVICE — STRAP,POSITIONING,KNEE/BODY,FOAM,4X60": Brand: MEDLINE

## (undated) DEVICE — NEPTUNE E-SEP SMOKE EVACUATION PENCIL, COATED, 70MM BLADE, PUSH BUTTON SWITCH: Brand: NEPTUNE E-SEP

## (undated) DEVICE — GOWN PLASTIC FILM THMBHKS UNIV BLUE: Brand: CARDINAL HEALTH

## (undated) DEVICE — ELECTRODE PT RET AD L9FT HI MOIST COND ADH HYDRGEL CORDED

## (undated) DEVICE — GAUZE,SPONGE,4"X4",16PLY,STRL,LF,10/TRAY: Brand: MEDLINE

## (undated) DEVICE — YANKAUER,SMOOTH HANDLE,HIGH CAPACITY: Brand: MEDLINE INDUSTRIES, INC.

## (undated) DEVICE — SNARE POLYP M W27MMXL240CM OVL STIFF DISP CAPTIVATOR

## (undated) DEVICE — SNARE VASC L240CM LOOP W10MM SHTH DIA2.4MM RND STIFF CLD

## (undated) DEVICE — SOLUTION IRRIG 1000ML H2O STRL BLT

## (undated) DEVICE — MASTISOL ADHESIVE LIQ 2/3ML

## (undated) DEVICE — PROBE GAMMA TRUNODE

## (undated) DEVICE — SYRINGE MED 50ML LUERSLIP TIP

## (undated) DEVICE — UNDERPAD INCONT W23XL36IN STD BLU POLYPR BK FLUF SFT

## (undated) DEVICE — BLADE,STAINLESS-STEEL,15,STRL,DISPOSABLE: Brand: MEDLINE

## (undated) DEVICE — SHEATH WAND COVER MOLLI

## (undated) DEVICE — CANNULA ORIG TL CLR W FOAM CUSHIONS AND 14FT SUPL TB 3 CHN

## (undated) DEVICE — BLADE ES ELASTOMERIC COAT INSUL DURABLE BEND UPTO 90DEG

## (undated) DEVICE — SUTURE VICRYL + SZ 3-0 L27IN ABSRB UD L26MM SH 1/2 CIR VCP416H

## (undated) DEVICE — MEDI-VAC NON-CONDUCTIVE SUCTION TUBING: Brand: CARDINAL HEALTH

## (undated) DEVICE — SHEARS ENDOSCP L9CM CRV HARM FOCS +

## (undated) DEVICE — GOWN,AURORA,FABRIC-REINFORCED,LARGE: Brand: MEDLINE

## (undated) DEVICE — SUTURE PERMA-HAND SZ 2-0 L30IN NONABSORBABLE BLK L26MM SH K833H

## (undated) DEVICE — CLEAN UP KIT: Brand: MEDLINE INDUSTRIES, INC.